# Patient Record
Sex: FEMALE | Race: WHITE | NOT HISPANIC OR LATINO | Employment: OTHER | URBAN - METROPOLITAN AREA
[De-identification: names, ages, dates, MRNs, and addresses within clinical notes are randomized per-mention and may not be internally consistent; named-entity substitution may affect disease eponyms.]

---

## 2017-04-03 ENCOUNTER — TELEPHONE (OUTPATIENT)
Dept: SURGERY | Facility: CLINIC | Age: 72
End: 2017-04-03

## 2017-04-03 NOTE — TELEPHONE ENCOUNTER
March 27, 2017 right screening mammogram.  Parenchyma is partially fatty replaced.  Negative right mammogram status post left mastectomy BI-RADS 1 negative.  [facility]

## 2017-04-04 ENCOUNTER — TELEPHONE (OUTPATIENT)
Dept: SURGERY | Facility: CLINIC | Age: 72
End: 2017-04-04

## 2017-04-04 ENCOUNTER — OFFICE VISIT (OUTPATIENT)
Dept: SURGERY | Facility: CLINIC | Age: 72
End: 2017-04-04

## 2017-04-04 VITALS
OXYGEN SATURATION: 98 % | SYSTOLIC BLOOD PRESSURE: 114 MMHG | DIASTOLIC BLOOD PRESSURE: 74 MMHG | TEMPERATURE: 97.8 F | WEIGHT: 155.2 LBS | BODY MASS INDEX: 27.5 KG/M2 | HEART RATE: 84 BPM | HEIGHT: 63 IN

## 2017-04-04 DIAGNOSIS — C50.212 MALIGNANT NEOPLASM OF UPPER-INNER QUADRANT OF LEFT FEMALE BREAST (HCC): ICD-10-CM

## 2017-04-04 DIAGNOSIS — Z12.31 ENCOUNTER FOR SCREENING MAMMOGRAM FOR MALIGNANT NEOPLASM OF BREAST: ICD-10-CM

## 2017-04-04 DIAGNOSIS — Z98.890 H/O MITRAL VALVE REPAIR: ICD-10-CM

## 2017-04-04 DIAGNOSIS — Z80.3 FH: BREAST CANCER: ICD-10-CM

## 2017-04-04 DIAGNOSIS — Z85.3 HISTORY OF BREAST CANCER IN FEMALE: ICD-10-CM

## 2017-04-04 PROCEDURE — 99213 OFFICE O/P EST LOW 20 MIN: CPT | Performed by: SURGERY

## 2017-04-04 RX ORDER — BUMETANIDE 1 MG/1
1 TABLET ORAL
COMMUNITY

## 2017-04-04 RX ORDER — POTASSIUM CHLORIDE 750 MG/1
10 TABLET, FILM COATED, EXTENDED RELEASE ORAL
COMMUNITY

## 2017-04-04 NOTE — TELEPHONE ENCOUNTER
Shandra's my risk panel dated March 10, 1978 detected a variant of uncertain significance.  No mutations detected.  lml

## 2017-04-04 NOTE — PROGRESS NOTES
Chief Complaint: Zeny Steve is a 71 y.o. female who was seen in consultation at the request of Fay Felix MD  for newly diagnosed breast cancer    History of Present Illness:  Patient presents with newly diagnosed breast cancer LEFT breast, second primary.    Her past breast cancer was diagnosed at age 41.  She was treated for this in 1986.  At that time she had a lumpectomy with Dr. mccormick as well as an axillary node dissection per her report.  She then received radiation therapy with Dr. Garsia at [facility].  She received no chemotherapy or tamoxifen per her report.    She notices some asymmetry in her breast exam in general as related to her past history of breast cancer.  She does note however that she did notice a thickening in the superior left breast around the time her sister was going to her cancer treatment.    She noted no other new masses, skin changes, nipple discharge, nipple changes prior to her most recent imaging.  Her most recent imaging includes the following: August 22, 2013 [facility] screening mammogram shows moderately dense breast tissue was stable upper outer quadrant left lumpectomy changes BI-RADS 2.  March 25, 2016 screening mammogram at [place] scattered fiber glandular densities, mass in the upper inner quadrant left breast, BI-RADS 0.  Then on April [facility], 2016 [facility] [place] [facility] mamm[facility] and ultrasound showed a 1.3 cm mass at 11:00, 7 cm from the nipple, posterior one third.  Ultrasound targeted to this area 11:00 8 cm from the nipple shows a 1.3 x 1.1 cm mass no abnormal lymph nodes seen BI-RADS 4.    She had a biopsy on the following day that showed: August 16, 2016 left breast ultrasound-guided biopsy a 12-gauge device ×6 cores.  Cork marker left in place.  Invasive mammary carcinoma, no special type, intermediate grade, no in situ component, fever new primary based on mammographic imaging, 7 mm, estrogen 0 progesterone 0 Ki-67  80% with a HER-2/willy ratio 1.15 and a copy number of 3.8.    I then ordered a breast MRI that showed in the left breast 12:00 posterior one third, 8 cm from the nipple a 2 x 1.6 x 1.5 cm mass.  Clip is within the mass, no other findings on the left, no findings on the right, no lymph nodes on the left looked abnormal.  She has her  ovaries, is postmenopausal, and takes nor hormones.  She had her uterus removed many years ago for bleeding.  Her family history includes the following: Mother age 40, sister age 40 named Christine Watson, a patient of ours, and niece aged 33 also a patient of ours named Shandra Cristobalver.      Pathology from left breast mastectomy. Note no sentinel lymph nodes were taken due to absence of radiotracer migration due to previous axillary lymph node dissection. Pathology returned as invasive mammary carcinoma, no special type, high grade, 3, 3, 3, 1.2 cm. Margins are clear closest margin being 1.2 cm from the deep margin. Negative skin and nipple. Stage TIc Nx    She reports no redness warmth or drainage from the incision.  Her drain has had 5 mL -5 mL- 5 mL over the past 3 days.  She took pain medicine for the first 2 days but then stopped this.    Interval history:  In the interim, Zeny took adjuvant TC ×4 cycles given every 3 weeks through a PICC line with Dr. Lebron.  She used a dignicap To keep her hair however she felt that this was only moderately helpful and was quite a time commitment.    She did not need radiation due to previous radiation.    She denies any nodules or skin discolorations to the left chest wall and denies any lumps in her armpits or neck.  She denies any masses, skin changes, nipple changes or discharge right breast.  She denies any headache, changes in balance, cough, belly pain, bone pain.    Her most recent imaging includes a right screening mammogram March 27, 2017 with no abnormal findings.  BI-RADS 1.    Review of Systems:  Review of Systems   Constitutional: Negative  for unexpected weight change (3 lb wt loss).   Respiratory: Positive for shortness of breath (ONLY W/PHYSICAL EXERTION).    Gastrointestinal: Positive for constipation.   Genitourinary: Positive for bladder incontinence.    Musculoskeletal: Positive for arthralgias.   Psychiatric/Behavioral: Positive for depression. The patient is nervous/anxious.    All other systems reviewed and are negative.       Past Medical and Surgical History:  Breast Biopsy History:  Patient has had the following breast biopsies:1986, LEFT BREAST BIOPSY, MALIGNANT  Breast Cancer HIstory:  Patient has the following past medical history of breast cancer treatment: LEFT BREAST LUMPECTOMY 1986, FOLLOWED BY RADIATION 6/1986.   Breast Operations, and year:  LEFT BREAST LUMPECTOMY BY DR. WILLAMS 1986  Obstetric/Gynecologic History:  Age menstrual periods began: 10  Patient is postmenopausal due to removal of her uterus in the following year: 1977   Number of pregnancies:3  Number of live births: 3  Number of abortions or miscarriages: 0  Age of delivery of first child: 17 YRS OLD   Patient did not breast feed.  Length of time taking birth control pills: APPROX. 2 YRS   Patient has never taken hormone replacement  PATIENT HAS OVARIES.     Past Surgical History:   Procedure Laterality Date   • BREAST LUMPECTOMY Left 1986   • CATARACT EXTRACTION     • GANGLION CYST EXCISION Left 1966   • HYSTERECTOMY  1977   • MASTECTOMY WITH SENTINEL NODE BIOPSY AND AXILLARY NODE DISSECTION Left 10/20/2016    Procedure: left total mastectomy;  Surgeon: Sneha Jameson MD;  [facility]  Service:    • MITRAL VALVULOPLASTY  06/10/2014   • MOLE REMOVAL      MULTIPLE SITES   • TOE NAIL AMPUTATION      LEFT FOOT       Past Medical History:   Diagnosis Date   • Acid reflux    • Anemia    • Arthritis    • CAD (coronary artery disease)    • Cancer     LUMPECTOMY 1986, HAS NEW SPOT IN LT BREAST   • Disease of thyroid gland     HYPOTHYROID   • Hyperlipidemia   "  • Knee pain     LEFT KNEE-ARTHRITIS/ NEEDS TOTAL KNEE REPLACEMENT   • Mitral regurgitation    • Mitral valve disorder     HAD MVR 6-2014   • Osteoarthritis    • Palpitations    • PONV (postoperative nausea and vomiting)    • Seasonal allergies    • Vitamin D deficiency        Prior Hospitalizations, other than for surgery or childbirth, and year:  NONE     Social History     Social History   • Marital status:      Spouse name: N/A   • Number of children: N/A   • Years of education: N/A     Occupational History   • Not on file.     Social History Main Topics   • Smoking status: Never Smoker   • Smokeless tobacco: Never Used   • Alcohol use No   • Drug use: No   • Sexual activity: Not on file     Other Topics Concern   • Not on file     Social History Narrative     Patient is .  Patient is retired.  Patient drinks 1 servings of caffeine per day.    Family History:  Family History   Problem Relation Age of Onset   • Breast cancer Mother 40   • Lung cancer Father    • Breast cancer Sister 40     BREAST CA REACCURANCE   • Breast cancer Other 33   • Lung cancer Other        Vital Signs:  /74  Pulse 84  Temp 97.8 °F (36.6 °C)  Ht 63\" (160 cm)  Wt 155 lb 3.2 oz (70.4 kg)  SpO2 98%  BMI 27.49 kg/m2     Medications:    Current Outpatient Prescriptions:   •  acetaminophen (TYLENOL) 500 MG tablet, Take 500 mg by mouth Every 6 (Six) Hours As Needed for mild pain (1-3)., Disp: , Rfl:   •  aspirin 81 MG EC tablet, Take 81 mg by mouth Daily., Disp: , Rfl:   •  bumetanide (BUMEX) 1 MG tablet, Take 1 mg by mouth., Disp: , Rfl:   •  celecoxib (CeleBREX) 200 MG capsule, Take 200 mg by mouth Daily., Disp: , Rfl:   •  cetirizine (ZyrTEC) 10 MG tablet, Take 10 mg by mouth Every Evening., Disp: , Rfl:   •  cimetidine (TAGAMET) 200 MG tablet, Take 200 mg by mouth Every Evening., Disp: , Rfl:   •  guaiFENesin (MUCINEX) 600 MG 12 hr tablet, Take 1,200 mg by mouth 2 (Two) Times a Day As Needed for cough., Disp: , " "Rfl:   •  levothyroxine (SYNTHROID, LEVOTHROID) 50 MCG tablet, Take 50 mcg by mouth Every Morning., Disp: , Rfl:   •  metoprolol succinate XL (TOPROL-XL) 50 MG 24 hr tablet, Take 50 mg by mouth Every Evening., Disp: , Rfl:   •  potassium chloride (K-DUR) 10 MEQ CR tablet, Take 10 mEq by mouth., Disp: , Rfl:   •  Pseudoephedrine-Acetaminophen (SINUS RELIEF PO), Take 1 tablet by mouth As Needed., Disp: , Rfl:   •  vitamin D (ERGOCALCIFEROL) 94746 UNITS capsule capsule, 50,000 Units Every 7 (Seven) Days. TAKES ON SUNDAY, Disp: , Rfl:      Allergies:  Allergies   Allergen Reactions   • Codeine Rash     rash       Physical Examination:  /74  Pulse 84  Temp 97.8 °F (36.6 °C)  Ht 63\" (160 cm)  Wt 155 lb 3.2 oz (70.4 kg)  SpO2 98%  BMI 27.49 kg/m2  General Appearance:  Patient is in no distress.  She is well kept and has an average build.   Psychiatric:  Patient with appropriate mood and affect. Alert and oriented to self, time, and place.    Breast, RIGHT:  medium sized, asymmetric with the contralateral  surgically absent side.  Breast skin is without erythema, edema, rashes.  There are no visible abnormalities upon inspection during the arm-raising maneuver or with hands on hips in the sitting position. There is no nipple retraction, discharge or nipple/areolar skin changes.There are no masses palpable in the sitting or supine positions.    Breast, LEFT:  surgically absent with a well healed vertical incision from her mastectomy October 20, 2016.  The incision was made vertically due to the proximity of tumor to skin at the 12:00 middle ring location.  No skin nodules or discolorations.    - Note median sternotomy from mitral valve repair in 2014.      Lymphatic:  There is no axillary, cervical, infraclavicular, or supraclavicular adenopathy bilaterally.  There is a soft tissue void in the left axilla from her prior axillary node dissection.  Eyes:  Pupils are round and reactive to " light.  Cardiovascular:  Heart rate and rhythm are regular.  Respiratory:  Lungs are clear bilaterally with no crackles or wheezes in any lung field.  Gastrointestinal:  Abdomen is soft, nondistended, and nontender.   Musculoskeletal:  Good strength in all 4 extremities.   There is limited range of motion in the LEFT shoulder, normal range of motion in the RIGHT shoulder.    Skin:  No new skin lesions or rashes on the skin excluding the breast (see breast exam above).        Imagin-22-13  [facility]  SCREENING MAMMOGRAM  JERMAN CELAYA  Stable post lumpectomy scarring in the upper outer left breast.  BIRADS:  2    3-25-16    MAMMOGRAM SCREENING BILATERAL  JERMAN CELAYA  Scattered fibroglandular densities.  Irregular mass seen in the upper inner quadrant of the left breast.  BIRADS CATEGORY:  0    -17-16    [facility]   LEFT DIAGNOSTIC MAMMO AND LEFT US    JERMAN CELAYA  The irregular mass in the upper inner quadrant of the left breast persists.  It measure 13 mm and is located at approximately the 11 o’clock position cm from the nipple, posterior depth.  Targeted ultrasound of the left breast at the 11 o’clock 8 cm from the nipple demonstrates an hypoechoic mass 13 x 8 x 11 mm and corresponds to the mammographic mass morphologically abnormal lymph nodes.  BIRADS CATEGORY:  4      29-16                               [facility] DR PRADO                              BILATERAL MRI                        JERMAN CELAYA  Left breast 12 o’clock posterior 1/3 of 8 cm from the nipple clip within an irregular enhancing mass. The mass measures 1.5 cm in the anterior to posterior 1.6 cm in the superior to inferior 2.0 cm in the medial to lateral. No other abnormal enhancement left breast. There is no evidence for left axillary adenopathy. There are no abnormal enhancement or morphology in the right.    2017 right screening mammogram. Parenchyma is partially fatty replaced. Negative right mammogram  "status post left mastectomy BI-RADS 1 negative. [facility]    Pathology:    8-16-16  [facility]  BIOPSY LEFT US GUIDED    JERMAN CELAYA  Left breast mass at 11 o’clock.  12 G [place], 6 core specimens, [place] cork shaped marker.    8-16-16  [facility]   PATHOLOGY    JERMAN CELAYA  Amplification of the HER-2 gene is not detected with ratio of 1.15.    Ki-67= 80%  ER=  0  NJ=  0  FINAL DIAGNOSIS:  Left breast 11:00, 8 cm from nipple.  Moderately differentiated ductal adenocarcinoma without in SITU component.  Favor new primary history of 1986 Ipsilateral Breast Ca noted (correlate with mammography).  Length of invasive component in material reviewed 7 mm.  Amplification of the HER-2 gene in not detected.  Her2/willy=  3.8  Ratio=  1.15     10/20/16            [facility]                 SURGICAL PATHOLOGY                   JERMAN CELAYA  Final Diagnosis   \"LEFT BREAST MASTECTOMY\":  INVASIVE MAMMARY CARCINOMA, NO SPECIAL TYPE, HIGH GRADE, 1.2 CM, MARGINS CLEAR -  SEE NOTE.  CLOSEST MARGIN: 1.2 CM (DEEP).  NEGATIVE SKIN AND NIPPLE.  NO LYMPH NODES IDENTIFIED.  HORMONE RECEPTORS, HER-2 WILLY APPARENTLY PERFORMED ON PRIOR BIOPSY.   REPEAT/CONFIRMATORY STUDIES ARE AVAILABLE UPON REQUEST.     This high grade tumor is consistent with a primary breast cancer in the absence of other suspicious primary tumor source. No in-situ carcinoma is seen. Confirmatory special stains are available upon request to prove/disprove this  Consideration. The tumor is staged as a primary breast cancer.     THM/erasmo IHC/a/TDJ       Addendum to pathology report dictated that the correct closest margin is 6 mm to the deep margin        Assessment:   Diagnosis Plan   1. Malignant neoplasm of upper-inner quadrant of left female breast  Mammo Screening Right With CAD    Mammo Screening Right With CAD    Ambulatory Referral to Multi-Disciplinary Clinic    Ambulatory Referral to Physical Therapy Bioimpedence, Lymphedema   2. History of breast cancer in female  " Mammo Screening Right With CAD    Mammo Screening Right With CAD    Ambulatory Referral to Multi-Disciplinary Clinic    Ambulatory Referral to Physical Therapy Bioimpedence, Lymphedema   3. FH: breast cancer  Mammo Screening Right With CAD    Mammo Screening Right With CAD    Ambulatory Referral to Multi-Disciplinary Clinic    Ambulatory Referral to Physical Therapy Bioimpedence, Lymphedema   4. H/O mitral valve repair  Mammo Screening Right With CAD    Mammo Screening Right With CAD    Ambulatory Referral to Multi-Disciplinary Clinic    Ambulatory Referral to Physical Therapy Bioimpedence, Lymphedema   5. Encounter for screening mammogram for malignant neoplasm of breast   Mammo Screening Right With CAD        1-  Left breast, palpable mass 11:00, 7.5 cm from the nipple.  1.3 cm on ultrasound, 2.75 cm on examination, 2.0 cm on MRI.  Clip seen within.  Lymph nodes normal on axillary ultrasound, examination, and MRI.  Invasive mammary carcinoma, no special type, intermediate grade, no in situ, 7 mm core, estrogen 0 progesterone 0 Ki-67 80%.  HER-2/willy negative with ratio of 1.15 and copy number of 3.8.    Clinical stage TICN0 stage Ia  [place] to be second primary not a recurrence.    October 20, 2016, left total mastectomy with no reconstruction.  [place] lymph node biopsy was attempted but there was no radiotracer migration due to prior complete axillary lymph node dissection.  Pathology- invasive mammary carcinoma, no special type, high grade, 3, 3, 3, 1.2 cm. Margins are clear closest margin being 1.2 cm from the deep margin. Negative skin and nipple. Stage TIc Nx    2-  Past history left breast cancer age 41, status post lumpectomy and radiation 1986 Dr. Felton.  No chemotherapy or tamoxifen.  Upper outer quadrant     3-  Mother age 40, sister (Christine Watson) age 40, niece (Shandra Bowling)  age 33  Niece has had genetic testing and found to be negative.  Shandra- Currently has pending panel testing.    4-  History of  mitral valve repair followed by Dr. Pierre      Plan:   Zeny is doing well today at her 6 moFU visit.  We reviewed her interval history, treatment, most recent imaging and reports, and examination together today.    There is no evidence of disease on her imaging or by exam or symptoms.    We are still awaiting Shandra's panel testing.    We discussed the survivorship clinic and she was interested in getting a treatment summary so we will arrange for that.  We discussed bio impedance surveillance for lymphedema and she was interested in this so we will try to schedule those for the same day due to her drive.    Her next right-sided mammogram is due March 28, 2018 and I will arrange for that and see her back after.  She usually has this at [facility].  This is in [place].    She reports that she does not need a refill on her post mastectomy bra and prostheses.    I've asked to continue her self breast exam and to call us in the interim with any concerns or changes.  Otherwise I will see her in March.      Sneha Jameson MD      We spent 16 min in visit 10 in face to face     Next Appointment:  Return for Next scheduled follow up, after imaging.      EMR Dragon/transcription disclaimer:    Much of this encounter note is an electronic transcription/translocation of spoken language to printed text.  The electronic translation of spoken language may permit erroneous, or at times, nonsensical words or phrases to be inadvertently transcribed.  Although I have reviewed the note from such areas, some may still exist.

## 2017-04-11 ENCOUNTER — TELEPHONE (OUTPATIENT)
Dept: OTHER | Facility: HOSPITAL | Age: 72
End: 2017-04-11

## 2017-04-11 NOTE — TELEPHONE ENCOUNTER
"Call placed to patient RE: open referral to survivorship clinic from Dr Jameson. I spoke to the patient and reviewed purpose and goals of survivorship visit as well as what to expect. Patient declines at present due to lack of interest - states, \"I don't want to talk about it anymore.\"  I offered to mail materials and contact information should she reconsider and she was agreeable to this. Info mailed to patient.  "

## 2017-08-03 ENCOUNTER — TELEPHONE (OUTPATIENT)
Dept: SURGERY | Facility: CLINIC | Age: 72
End: 2017-08-03

## 2017-08-03 NOTE — TELEPHONE ENCOUNTER
Note dated August 2, 2017 medical oncology from Dr. Gurvinder Lebron: November 30 through the 22nd 2017 4 cycles of TC complicated by drug extravasation.

## 2018-01-03 ENCOUNTER — TELEPHONE (OUTPATIENT)
Dept: SURGERY | Facility: CLINIC | Age: 73
End: 2018-01-03

## 2018-01-03 NOTE — TELEPHONE ENCOUNTER
MARLEEN for pt to call.    Scheduled Rt Screen Mammo 3-29-18 @ 10:00 [facility]  Return to see Dr CASTELLANOS 4-9-18 arrive 12:45    kdl      Pt called and confirmed  She moved her appt with Dr CASTELLANOS to  4/12/18 arrive 3:15

## 2018-04-06 ENCOUNTER — TELEPHONE (OUTPATIENT)
Dept: SURGERY | Facility: CLINIC | Age: 73
End: 2018-04-06

## 2018-04-07 NOTE — TELEPHONE ENCOUNTER
[facility] RIGHT screen mammo 3-29-18- scatt FG- [id number]

## 2018-04-12 ENCOUNTER — OFFICE VISIT (OUTPATIENT)
Dept: SURGERY | Facility: CLINIC | Age: 73
End: 2018-04-12

## 2018-04-12 VITALS
BODY MASS INDEX: 28.88 KG/M2 | DIASTOLIC BLOOD PRESSURE: 77 MMHG | WEIGHT: 163 LBS | HEIGHT: 63 IN | HEART RATE: 73 BPM | SYSTOLIC BLOOD PRESSURE: 123 MMHG | OXYGEN SATURATION: 98 %

## 2018-04-12 DIAGNOSIS — Z17.1 MALIGNANT NEOPLASM OF UPPER-INNER QUADRANT OF LEFT BREAST IN FEMALE, ESTROGEN RECEPTOR NEGATIVE (HCC): Primary | ICD-10-CM

## 2018-04-12 DIAGNOSIS — Z85.3 HISTORY OF BREAST CANCER IN FEMALE: ICD-10-CM

## 2018-04-12 DIAGNOSIS — Z12.31 ENCOUNTER FOR SCREENING MAMMOGRAM FOR MALIGNANT NEOPLASM OF BREAST: ICD-10-CM

## 2018-04-12 DIAGNOSIS — Z80.3 FH: BREAST CANCER: ICD-10-CM

## 2018-04-12 DIAGNOSIS — C50.212 MALIGNANT NEOPLASM OF UPPER-INNER QUADRANT OF LEFT BREAST IN FEMALE, ESTROGEN RECEPTOR NEGATIVE (HCC): Primary | ICD-10-CM

## 2018-04-12 DIAGNOSIS — Z98.890 H/O MITRAL VALVE REPAIR: ICD-10-CM

## 2018-04-12 PROCEDURE — 99213 OFFICE O/P EST LOW 20 MIN: CPT | Performed by: SURGERY

## 2018-04-12 RX ORDER — SODIUM PHOSPHATE,MONO-DIBASIC 19G-7G/118
ENEMA (ML) RECTAL
COMMUNITY

## 2018-04-12 NOTE — PROGRESS NOTES
Chief Complaint: Zeny Steve is a 72 y.o. female who was seen in consultation at the request of No ref. provider found  for newly diagnosed breast cancer and a followup visit    History of Present Illness:  Patient presents with newly diagnosed breast cancer LEFT breast, second primary.    Her past breast cancer was diagnosed at age 41.  She was treated for this in 1986.  At that time she had a lumpectomy with Dr. mccormick as well as an axillary node dissection per her report.  She then received radiation therapy with Dr. Garsia at [facility].  She received no chemotherapy or tamoxifen per her report.    She notices some asymmetry in her breast exam in general as related to her past history of breast cancer.  She does note however that she did notice a thickening in the superior left breast around the time her sister was going to her cancer treatment.    She noted no other new masses, skin changes, nipple discharge, nipple changes prior to her most recent imaging.  Her most recent imaging includes the following: August 22, 2013 [facility] screening mammogram shows moderately dense breast tissue was stable upper outer quadrant left lumpectomy changes BI-RADS 2.  March 25, 2016 screening mammogram at [place] scattered fiber glandular densities, mass in the upper inner quadrant left breast, BI-RADS 0.  Then on April 7, 2016 at [facility] left diagnostic mammogram and ultrasound showed a 1.3 cm mass at 11:00, 7 cm from the nipple, posterior one third.  Ultrasound targeted to this area 11:00 8 cm from the nipple shows a 1.3 x 1.1 cm mass no abnormal lymph nodes seen BI-RADS 4.    She had a biopsy on the following day that showed: August 16, 2016 left breast ultrasound-guided biopsy a 12-gauge device ×6 cores.  Cork marker left in place.  Invasive mammary carcinoma, no special type, intermediate grade, no in situ component, fever new primary based on mammographic imaging, 7 mm, estrogen 0  progesterone 0 Ki-67 80% with a HER-2/willy ratio 1.15 and a copy number of 3.8.    I then ordered a breast MRI that showed in the left breast 12:00 posterior one third, 8 cm from the nipple a 2 x 1.6 x 1.5 cm mass.  Clip is within the mass, no other findings on the left, no findings on the right, no lymph nodes on the left looked abnormal.  She has her  ovaries, is postmenopausal, and takes nor hormones.  She had her uterus removed many years ago for bleeding.  Her family history includes the following: Mother age 40, sister age 40 named Christine Watson, a patient of ours, and niece aged 33 also a patient of ours named Shandra Bowling.      Pathology from left breast mastectomy. Note no sentinel lymph nodes were taken due to absence of radiotracer migration due to previous axillary lymph node dissection. Pathology returned as invasive mammary carcinoma, no special type, high grade, 3, 3, 3, 1.2 cm. Margins are clear closest margin being 1.2 cm from the deep margin. Negative skin and nipple. Stage TIc Nx    She reports no redness warmth or drainage from the incision.  Her drain has had 5 mL -5 mL- 5 mL over the past 3 days.  She took pain medicine for the first 2 days but then stopped this.      In the interim, Zeny took adjuvant TC ×4 cycles given every 3 weeks through a PICC line with Dr. Lebron.  She used a dignicap To keep her hair however she felt that this was only moderately helpful and was quite a time commitment.    She did not need radiation due to previous radiation.    She denies any nodules or skin discolorations to the left chest wall and denies any lumps in her armpits or neck.  She denies any masses, skin changes, nipple changes or discharge right breast.  She denies any headache, changes in balance, cough, belly pain, bone pain.    Her most recent imaging includes a right screening mammogram March 27, 2017 with no abnormal findings.  BI-RADS 1.    Interval history:    In the interim,  Zeny Steve  has done  well.  She has noted no changes in her breast exam. No new masses, skin changes, nipple changes, nipple discharge either breast.   She denies headache, bone pain, belly pain, cough, changes in vision or gait.    Her most recent imaging includes the following:  [facility] RIGHT screen mammo 3-29-18- [facility]- BR2    She has gained 8#.    Review of Systems:  Review of Systems   Constitutional: Positive for unexpected weight change (8 LB GAIN).   Respiratory: Positive for shortness of breath (ONLY W/PHYSICAL EXERTION).    Gastrointestinal: Positive for constipation.   Genitourinary: Positive for bladder incontinence.    Musculoskeletal: Positive for arthralgias.   Psychiatric/Behavioral: Positive for depression. The patient is nervous/anxious.    All other systems reviewed and are negative.       Past Medical and Surgical History:  Breast Biopsy History:  Patient has had the following breast biopsies:1986, LEFT BREAST BIOPSY, MALIGNANT  Breast Cancer HIstory:  Patient has the following past medical history of breast cancer treatment: LEFT BREAST LUMPECTOMY 1986, FOLLOWED BY RADIATION 6/1986.   Breast Operations, and year:  LEFT BREAST LUMPECTOMY BY DR. WILLAMS 1986  Obstetric/Gynecologic History:  Age menstrual periods began: 10  Patient is postmenopausal due to removal of her uterus in the following year: 1977   Number of pregnancies:3  Number of live births: 3  Number of abortions or miscarriages: 0  Age of delivery of first child: 17 YRS OLD   Patient did not breast feed.  Length of time taking birth control pills: APPROX. 2 YRS   Patient has never taken hormone replacement  PATIENT HAS OVARIES.     Past Surgical History:   Procedure Laterality Date   • BREAST LUMPECTOMY Left 1986   • CATARACT EXTRACTION     • GANGLION CYST EXCISION Left 1966   • HYSTERECTOMY  1977   • MASTECTOMY WITH SENTINEL NODE BIOPSY AND AXILLARY NODE DISSECTION Left 10/20/2016    Procedure: left total mastectomy;  Surgeon: Sneha Jameson,  "MD;  [place] [facility] [place] [facility]:    • MITRAL VALVULOPLASTY  06/10/2014   • MOLE REMOVAL      MULTIPLE SITES   • TOE NAIL AMPUTATION      LEFT FOOT       Past Medical History:   Diagnosis Date   • Acid reflux    • Anemia    • Arthritis    • CAD (coronary artery disease)    • Cancer     LUMPECTOMY 1986, HAS NEW SPOT IN LT BREAST   • Disease of thyroid gland     HYPOTHYROID   • Hyperlipidemia    • Knee pain     LEFT KNEE-ARTHRITIS/ NEEDS TOTAL KNEE REPLACEMENT   • Mitral regurgitation    • Mitral valve disorder     HAD MVR 6-2014   • Osteoarthritis    • Palpitations    • PONV (postoperative nausea and vomiting)    • Seasonal allergies    • Vitamin D deficiency        Prior Hospitalizations, other than for surgery or childbirth, and year:  NONE     Social History     Social History   • Marital status:      Spouse name: N/A   • Number of children: N/A   • Years of education: N/A     Occupational History   • Not on file.     Social History Main Topics   • Smoking status: Never Smoker   • Smokeless tobacco: Never Used   • Alcohol use No   • Drug use: No   • Sexual activity: Not on file     Other Topics Concern   • Not on file     Social History Narrative   • No narrative on file     Patient is .  Patient is retired.  Patient drinks 1 servings of caffeine per day.    Family History:  Family History   Problem Relation Age of Onset   • Breast cancer Mother 40   • Lung cancer Father    • Breast cancer Sister 40     BREAST CA REACCURANCE   • Breast cancer Other 33   • Lung cancer Other        Vital Signs:  /77   Pulse 73   Ht 160 cm (63\")   Wt 73.9 kg (163 lb)   SpO2 98%   BMI 28.87 kg/m²      Medications:    Current Outpatient Prescriptions:   •  acetaminophen (TYLENOL) 500 MG tablet, Take 500 mg by mouth Every 6 (Six) Hours As Needed for mild pain (1-3)., Disp: , Rfl:   •  aspirin 81 MG EC tablet, Take 81 mg by mouth Daily., Disp: , Rfl:   •  bumetanide (BUMEX) 1 MG tablet, Take 1 mg by mouth., " "Disp: , Rfl:   •  celecoxib (CeleBREX) 200 MG capsule, Take 200 mg by mouth Daily., Disp: , Rfl:   •  cetirizine (ZyrTEC) 10 MG tablet, Take 10 mg by mouth Every Evening., Disp: , Rfl:   •  cimetidine (TAGAMET) 200 MG tablet, Take 200 mg by mouth Every Evening., Disp: , Rfl:   •  glucosamine sulfate 500 MG capsule capsule, Take  by mouth 3 (Three) Times a Day With Meals., Disp: , Rfl:   •  guaiFENesin (MUCINEX) 600 MG 12 hr tablet, Take 1,200 mg by mouth 2 (Two) Times a Day As Needed for cough., Disp: , Rfl:   •  levothyroxine (SYNTHROID, LEVOTHROID) 50 MCG tablet, Take 50 mcg by mouth Every Morning., Disp: , Rfl:   •  metoprolol succinate XL (TOPROL-XL) 50 MG 24 hr tablet, Take 50 mg by mouth Every Evening., Disp: , Rfl:   •  potassium chloride (K-DUR) 10 MEQ CR tablet, Take 10 mEq by mouth., Disp: , Rfl:   •  Pseudoephedrine-Acetaminophen (SINUS RELIEF PO), Take 1 tablet by mouth As Needed., Disp: , Rfl:   •  vitamin D (ERGOCALCIFEROL) 13479 UNITS capsule capsule, 50,000 Units Every 7 (Seven) Days. TAKES ON SUNDAY, Disp: , Rfl:      Allergies:  Allergies   Allergen Reactions   • Codeine Rash     rash       Physical Examination:  /77   Pulse 73   Ht 160 cm (63\")   Wt 73.9 kg (163 lb)   SpO2 98%   BMI 28.87 kg/m²   General Appearance:  Patient is in no distress.  She is well kept and has an average build.   Psychiatric:  Patient with appropriate mood and affect. Alert and oriented to self, time, and place.    Breast, RIGHT:  medium sized, asymmetric with the contralateral  surgically absent side.  Breast skin is without erythema, edema, rashes.  There are no visible abnormalities upon inspection during the arm-raising maneuver or with hands on hips in the sitting position. There is no nipple retraction, discharge or nipple/areolar skin changes.There are no masses palpable in the sitting or supine positions.    Breast, LEFT:  surgically absent with a well healed vertical incision from her mastectomy " 2016.  The incision was made vertically due to the proximity of tumor to skin at the 12:00 middle ring location.  No skin nodules or discolorations.    - Note median sternotomy from mitral valve repair in .      Lymphatic:  There is no axillary, cervical, infraclavicular, or supraclavicular adenopathy bilaterally.  There is a soft tissue void in the left axilla from her prior axillary node dissection.  Eyes:  Pupils are round and reactive to light.  Cardiovascular:  Heart rate and rhythm are regular.  Respiratory:  Lungs are clear bilaterally with no crackles or wheezes in any lung field.  Gastrointestinal:  Abdomen is soft, nondistended, and nontender.   Musculoskeletal:  Good strength in all 4 extremities.   There is limited range of motion in the LEFT shoulder, normal range of motion in the RIGHT shoulder.    Skin:  No new skin lesions or rashes on the skin excluding the breast (see breast exam above).        Imagin-22-13  [facility]  SCREENING MAMMOGRAM  CHANDLER CELAYADA  Stable post lumpectomy scarring in the upper outer left breast.  BIRADS:  2    3-25-16    MAMMOGRAM SCREENING BILATERAL  BOTHE JERMAN  Scattered fibroglandular densities.  Irregular mass seen in the upper inner quadrant of the left breast.  BIRADS CATEGORY:  0    4-17-16    [facility]   LEFT DIAGNOSTIC MAMMO AND LEFT US    BOTHE JERMAN  The irregular mass in the upper inner quadrant of the left breast persists.  It measure 13 mm and is located at approximately the 11 o’clock position cm from the nipple, posterior depth.  Targeted ultrasound of the left breast at the 11 o’clock 8 cm from the nipple demonstrates an hypoechoic mass 13 x 8 x 11 mm and corresponds to the mammographic mass morphologically abnormal lymph nodes.  BIRADS CATEGORY:  4      29-16                               [facility] DR PRADO                              BILATERAL MRI                        BOTHE, JERMAN  Left breast 12 o’clock posterior  "1/3 of 8 cm from the nipple clip within an irregular enhancing mass. The mass measures 1.5 cm in the anterior to posterior 1.6 cm in the superior to inferior 2.0 cm in the medial to lateral. No other abnormal enhancement left breast. There is no evidence for left axillary adenopathy. There are no abnormal enhancement or morphology in the right.    March 27, 2017 right screening mammogram. Parenchyma is partially fatty replaced. Negative right mammogram status post left mastectomy BI-RADS 1 negative. [facility] RIGHT screen mammo 3-29-18- [facility]- BR2    Pathology:    8-16-16  [facility]  BIOPSY LEFT US GUIDED    CHRISTENTY JERMAN  Left breast mass at 11 o’clock.  12 G [place], 6 core specimens, [employer] cork shaped marker.    8-16-16  [facility]   PATHOLOGY    JERMAN CELAYA  Amplification of the HER-2 gene is not detected with ratio of 1.15.    Ki-67= 80%  ER=  0  GA=  0  FINAL DIAGNOSIS:  Left breast 11:00, 8 cm from nipple.  Moderately differentiated ductal adenocarcinoma without in SITU component.  Favor new primary history of 1986 Ipsilateral Breast Ca noted (correlate with mammography).  Length of invasive component in material reviewed 7 mm.  Amplification of the HER-2 gene in not detected.  Her2/willy=  3.8  Ratio=  1.15     10/20/16            [facility]                 SURGICAL PATHOLOGY                   JERMAN CELAYA  Final Diagnosis   \"LEFT BREAST MASTECTOMY\":  INVASIVE MAMMARY CARCINOMA, NO SPECIAL TYPE, HIGH GRADE, 1.2 CM, MARGINS CLEAR -  SEE NOTE.  CLOSEST MARGIN: 1.2 CM (DEEP).  NEGATIVE SKIN AND NIPPLE.  NO LYMPH NODES IDENTIFIED.  HORMONE RECEPTORS, HER-2 WILLY APPARENTLY PERFORMED ON PRIOR BIOPSY.   REPEAT/CONFIRMATORY STUDIES ARE AVAILABLE UPON REQUEST.     This high grade tumor is consistent with a primary breast cancer in the absence of other suspicious primary tumor source. No in-situ carcinoma is seen. Confirmatory special stains are available upon request to prove/disprove " this  Consideration. The tumor is staged as a primary breast cancer.     THM/jse IHC/a/TDJ       Addendum to pathology report dictated that the correct closest margin is 6 mm to the deep margin    Note dated August 2, 2017 medical oncology from Dr. Gurvinder Lebron: November 30 through the 22nd 2017 4 cycles of TC complicated by drug extravasation.    Assessment:   Diagnosis Plan   1. Malignant neoplasm of upper-inner quadrant of left breast in female, estrogen receptor negative  Mammo Screening Modified With Tomosynthesis Right With CAD   2. History of breast cancer in female     3. FH: breast cancer     4. H/O mitral valve repair     5. Encounter for screening mammogram for malignant neoplasm of breast   Mammo Screening Modified With Tomosynthesis Right With CAD      1-  Left breast, palpable mass 11:00, 7.5 cm from the nipple.  1.3 cm on ultrasound, 2.75 cm on examination, 2.0 cm on MRI.  Clip seen within.  Lymph nodes normal on axillary ultrasound, examination, and MRI.  Invasive mammary carcinoma, no special type, intermediate grade, no in situ, 7 mm core, estrogen 0 progesterone 0 Ki-67 80%.  HER-2/willy negative with ratio of 1.15 and copy number of 3.8.    Clinical stage TIcN0 stage Ia  [place] to be second primary not a recurrence.    October 20, 2016, left total mastectomy with no reconstruction.  [place] lymph node biopsy was attempted but there was no radiotracer migration due to prior complete axillary lymph node dissection.  Pathology- invasive mammary carcinoma, no special type, high grade, 3, 3, 3, 1.2 cm. Margins are clear closest margin being 1.2 cm from the deep margin. Negative skin and nipple. Stage TIc Nx    2-  Past history left breast cancer age 41, status post lumpectomy and radiation 1986 Dr. Felton.  No chemotherapy or tamoxifen.  Upper outer quadrant     3-  Mother age 40, sister (Christine Watson) age 40, niece (Shandra Bowling)  age 33  Niece has had genetic testing and found to be negative.  Shandra-  Currently has pending panel testing.  [facility] My Risk panel was negative for mutation- VUS noted in the APC gene dated 3-17-17    4-  History of mitral valve repair followed by Dr. Pierre- presently cancelled LEFT knee surgery to have heart cath      Plan:   Zeny is doing well today at her 1.5 year FU visit.  We reviewed her interval history, treatment, most recent imaging and reports, and examination together today.    There is no evidence of disease on her imaging or by exam or symptoms.    She has declined survivorship at this time.    Her next right-sided mammogram is due March 30, 2018 [facility]  and I will arrange for that and see her back after.    She reports that [facility]on need a refill on her post mastectomy bra and prostheses and we gave her this today.    I've asked to continue her self breast exam and to call us in the interim with any concerns or changes.  Otherwise I will see her in March.      SHe sees Dr Lebron next month.  She has gained weight but is back to her pre treatment baseline of 163.    Note she tells me today that she had to cancel her LEFT knee surgery in order to have a heart cath.    Sneha Jameson MD      We spent 15 min in visit 9 in face to face     Next Appointment:  Return for Next scheduled follow up, after imaging.      EMR Dragon/transcription disclaimer:    Much of this encounter note is an electronic transcription/translocation of spoken language to printed text.  The electronic translation of spoken language may permit erroneous, or at times, nonsensical words or phrases to be inadvertently transcribed.  Although I have reviewed the note from such areas, some may still exist.

## 2018-07-02 ENCOUNTER — TELEPHONE (OUTPATIENT)
Dept: SURGERY | Facility: CLINIC | Age: 73
End: 2018-07-02

## 2018-07-02 NOTE — TELEPHONE ENCOUNTER
Note from Dr. Gurvinder Lebron dated June 20, 2018: Patient received 4 cycles of Taxotere and cyclophosphamide from November 30, 2016 through February 2, 2017..  Plan for a 4-5 month follow-up.

## 2019-01-15 ENCOUNTER — TELEPHONE (OUTPATIENT)
Dept: SURGERY | Facility: CLINIC | Age: 74
End: 2019-01-15

## 2019-02-21 ENCOUNTER — TELEPHONE (OUTPATIENT)
Dept: SURGERY | Facility: CLINIC | Age: 74
End: 2019-02-21

## 2019-02-21 NOTE — TELEPHONE ENCOUNTER
[facility] does not do the [facility] imaging.    I have scheduled pt for  Rt 3D screen Mammo at [facility]  4-2-19 arrive 11:15    Return to see Dr CASTELLANOS 4-15-19 arrive   12:15    Spoke to pt she v/u with appointments  kdl

## 2019-04-08 ENCOUNTER — TELEPHONE (OUTPATIENT)
Dept: SURGERY | Facility: CLINIC | Age: 74
End: 2019-04-08

## 2019-04-08 NOTE — TELEPHONE ENCOUNTER
[facility] April 2, 2019 right screening mammogram with 3D.  Scattered fibroglandular densities in the right breast.  BI-RADS 1.

## 2019-04-24 ENCOUNTER — OFFICE VISIT (OUTPATIENT)
Dept: SURGERY | Facility: CLINIC | Age: 74
End: 2019-04-24

## 2019-04-24 VITALS
SYSTOLIC BLOOD PRESSURE: 118 MMHG | OXYGEN SATURATION: 98 % | HEART RATE: 76 BPM | HEIGHT: 63 IN | WEIGHT: 164 LBS | BODY MASS INDEX: 29.06 KG/M2 | DIASTOLIC BLOOD PRESSURE: 62 MMHG

## 2019-04-24 DIAGNOSIS — Z80.3 FH: BREAST CANCER: ICD-10-CM

## 2019-04-24 DIAGNOSIS — Z85.3 HISTORY OF BREAST CANCER IN FEMALE: ICD-10-CM

## 2019-04-24 DIAGNOSIS — C50.212 MALIGNANT NEOPLASM OF UPPER-INNER QUADRANT OF LEFT BREAST IN FEMALE, ESTROGEN RECEPTOR NEGATIVE (HCC): Primary | ICD-10-CM

## 2019-04-24 DIAGNOSIS — Z98.890 H/O MITRAL VALVE REPAIR: ICD-10-CM

## 2019-04-24 DIAGNOSIS — Z17.1 MALIGNANT NEOPLASM OF UPPER-INNER QUADRANT OF LEFT BREAST IN FEMALE, ESTROGEN RECEPTOR NEGATIVE (HCC): Primary | ICD-10-CM

## 2019-04-24 PROCEDURE — 99213 OFFICE O/P EST LOW 20 MIN: CPT | Performed by: SURGERY

## 2019-04-24 RX ORDER — ATORVASTATIN CALCIUM 20 MG/1
20 TABLET, FILM COATED ORAL DAILY
COMMUNITY

## 2019-04-24 NOTE — PROGRESS NOTES
Chief Complaint: Zeny Steve is a 73 y.o. female who was seen in consultation at the request of for newly diagnosed breast cancer and a followup visit    History of Present Illness:  Patient presents with newly diagnosed breast cancer LEFT breast, second primary.    Her past breast cancer was diagnosed at age 41.  She was treated for this in 1986.  At that time she had a lumpectomy with Dr. mccormick as well as an axillary node dissection per her report.  She then received radiation therapy with Dr. Garsia at [facility].  She received no chemotherapy or tamoxifen per her report.    She notices some asymmetry in her breast exam in general as related to her past history of breast cancer.  She does note however that she did notice a thickening in the superior left breast around the time her sister was going to her cancer treatment.    She noted no other new masses, skin changes, nipple discharge, nipple changes prior to her most recent imaging.  Her most recent imaging includes the following: August 22, 2013 [facility] screening mammogram shows moderately dense breast tissue was stable upper outer quadrant left lumpectomy changes BI-RADS 2.  March 25, 2016 screening mammogram at [facility] scattered fiber glandular densities, mass in the upper inner quadrant left breast, BI-RADS 0.  Then on April 7, 2016 at [facility] left diagnostic mammogram and ultrasound showed a 1.3 cm mass at 11:00, 7 cm from the nipple, posterior one third.  Ultrasound targeted to this area 11:00 8 cm from the nipple shows a 1.3 x 1.1 cm mass no abnormal lymph nodes seen BI-RADS 4.    She had a biopsy on the following day that showed: August 16, 2016 left breast ultrasound-guided biopsy a 12-gauge device ×6 cores.  Cork marker left in place.  Invasive mammary carcinoma, no special type, intermediate grade, no in situ component, fever new primary based on mammographic imaging, 7 mm, estrogen 0 progesterone 0 Ki-67 80%  with a HER-2/willy ratio 1.15 and a copy number of 3.8.    I then ordered a breast MRI that showed in the left breast 12:00 posterior one third, 8 cm from the nipple a 2 x 1.6 x 1.5 cm mass.  Clip is within the mass, no other findings on the left, no findings on the right, no lymph nodes on the left looked abnormal.  She has her  ovaries, is postmenopausal, and takes nor hormones.  She had her uterus removed many years ago for bleeding.  Her family history includes the following: Mother age 40, sister age 40 named Christine Watson, a patient of ours, and niece aged 33 also a patient of ours named Shandra Bowling.      Pathology from left breast mastectomy. Note no sentinel lymph nodes were taken due to absence of radiotracer migration due to previous axillary lymph node dissection. Pathology returned as invasive mammary carcinoma, no special type, high grade, 3, 3, 3, 1.2 cm. Margins are clear closest margin being 1.2 cm from the deep margin. Negative skin and nipple. Stage TIc Nx    She reports no redness warmth or drainage from the incision.  Her drain has had 5 mL -5 mL- 5 mL over the past 3 days.  She took pain medicine for the first 2 days but then stopped this.      In the interim, Zeny took adjuvant TC ×4 cycles given every 3 weeks through a PICC line with Dr. Lebron.  She used a dignicap To keep her hair however she felt that this was only moderately helpful and was quite a time commitment.    She did not need radiation due to previous radiation.    She denies any nodules or skin discolorations to the left chest wall and denies any lumps in her armpits or neck.  She denies any masses, skin changes, nipple changes or discharge right breast.  She denies any headache, changes in balance, cough, belly pain, bone pain.    Her most recent imaging includes a right screening mammogram March 27, 2017 with no abnormal findings.  BI-RADS 1.      In the interim,  Zeny Steve  has done well.  She has noted no changes in her  breast exam. No new masses, skin changes, nipple changes, nipple discharge RT breast.   She denies headache, bone pain, belly pain, cough, changes in vision or gait.    Her most recent imaging includes the following:  [facility] RIGHT screen mammo 3-29-18- [facility]att [facility]G- BR2    She has gained 8#.      Interval history:    In the interim,  Zeny Steve  has done well.  She has noted no changes in her breast exam. No new masses, skin changes, nipple changes, nipple discharge RT breast. No chest wall nodules or discolorations LEFT.  She denies headache, bone pain, belly pain, cough, changes in vision or gait.  Her most recent imaging includes the following:  [facility] April 2, 2019 right screening mammogram with 3D.  Scattered fibroglandular densities in the right breast.  BI-RADS 1.    She continues to wear her prosthesis.    She had a normal heart cath and then went on to get both of her knees replaced in 2018    Review of Systems:  Review of Systems   Constitutional: Positive for unexpected weight change (8 LB GAIN).   Respiratory: Positive for shortness of breath (ONLY W/PHYSICAL EXERTION).    Gastrointestinal: Positive for constipation.   Genitourinary: Positive for bladder incontinence.    Musculoskeletal: Positive for arthralgias.   Psychiatric/Behavioral: Positive for depression. The patient is nervous/anxious.    All other systems reviewed and are negative.       Past Medical and Surgical History:  Breast Biopsy History:  Patient has had the following breast biopsies:1986, LEFT BREAST BIOPSY, MALIGNANT  Breast Cancer HIstory:  Patient has the following past medical history of breast cancer treatment: LEFT BREAST LUMPECTOMY 1986, FOLLOWED BY RADIATION 6/1986.   Breast Operations, and year:  LEFT BREAST LUMPECTOMY BY DR. WILLAMS 1986  Obstetric/Gynecologic History:  Age menstrual periods began: 10  Patient is postmenopausal due to removal of her uterus in the following year: 1977   Number of pregnancies:3  Number  of live births: 3  Number of abortions or miscarriages: 0  Age of delivery of first child: 17 YRS OLD   Patient did not breast feed.  Length of time taking birth control pills: APPROX. 2 YRS   Patient has never taken hormone replacement  PATIENT HAS OVARIES.     Past Surgical History:   Procedure Laterality Date   • BREAST LUMPECTOMY Left 1986   • CATARACT EXTRACTION     • GANGLION CYST EXCISION Left 1966   • HYSTERECTOMY  1977   • MASTECTOMY WITH SENTINEL NODE BIOPSY AND AXILLARY NODE DISSECTION Left 10/20/2016    Procedure: left total mastectomy;  Surgeon: Sneha Jameson MD;  Location: [facility] OR [facility];  Service:    • MITRAL VALVULOPLASTY  06/10/2014   • MOLE REMOVAL      MULTIPLE SITES   • TOE NAIL AMPUTATION      LEFT FOOT       Past Medical History:   Diagnosis Date   • Acid reflux    • Anemia    • Arthritis    • CAD (coronary artery disease)    • Cancer (CMS/HCC)     LUMPECTOMY 1986, HAS NEW SPOT IN LT BREAST   • Disease of thyroid gland     HYPOTHYROID   • Hyperlipidemia    • Knee pain     LEFT KNEE-ARTHRITIS/ NEEDS TOTAL KNEE REPLACEMENT   • Mitral regurgitation    • Mitral valve disorder     HAD MVR 6-2014   • Osteoarthritis    • Palpitations    • PONV (postoperative nausea and vomiting)    • Seasonal allergies    • Vitamin D deficiency        Prior Hospitalizations, other than for surgery or childbirth, and year:  NONE     Social History     Socioeconomic History   • Marital status:      Spouse name: Not on file   • Number of children: Not on file   • Years of education: Not on file   • Highest education level: Not on file   Tobacco Use   • Smoking status: Never Smoker   • Smokeless tobacco: Never Used   Substance and Sexual Activity   • Alcohol use: No   • Drug use: No     Patient is .  Patient is retired.  Patient drinks 1 servings of caffeine per day.    Family History:  Family History   Problem Relation Age of Onset   • Breast cancer Mother 40   • Lung cancer Father    • Breast cancer  "Sister 40        BREAST CA REACCURANCE   • Breast cancer Other 33   • Lung cancer Other        Vital Signs:  /62 (BP Location: Right arm, Patient Position: Sitting, Cuff Size: Adult)   Pulse 76   Ht 160 cm (63\")   Wt 74.4 kg (164 lb)   LMP  (LMP Unknown)   SpO2 98%   Breastfeeding? No   BMI 29.05 kg/m²      Medications:    Current Outpatient Medications:   •  acetaminophen (TYLENOL) 500 MG tablet, Take 500 mg by mouth Every 6 (Six) Hours As Needed for mild pain (1-3)., Disp: , Rfl:   •  aspirin 81 MG EC tablet, Take 81 mg by mouth Daily., Disp: , Rfl:   •  atorvastatin (LIPITOR) 20 MG tablet, Take 20 mg by mouth Daily., Disp: , Rfl:   •  bumetanide (BUMEX) 1 MG tablet, Take 1 mg by mouth., Disp: , Rfl:   •  celecoxib (CeleBREX) 200 MG capsule, Take 200 mg by mouth Daily., Disp: , Rfl:   •  cetirizine (ZyrTEC) 10 MG tablet, Take 10 mg by mouth Every Evening., Disp: , Rfl:   •  cimetidine (TAGAMET) 200 MG tablet, Take 200 mg by mouth Every Evening., Disp: , Rfl:   •  glucosamine sulfate 500 MG capsule capsule, Take  by mouth 3 (Three) Times a Day With Meals., Disp: , Rfl:   •  levothyroxine (SYNTHROID, LEVOTHROID) 50 MCG tablet, Take 50 mcg by mouth Every Morning., Disp: , Rfl:   •  metoprolol succinate XL (TOPROL-XL) 50 MG 24 hr tablet, Take 100 mg by mouth Every Evening., Disp: , Rfl:   •  potassium chloride (K-DUR) 10 MEQ CR tablet, Take 10 mEq by mouth., Disp: , Rfl:   •  vitamin D (ERGOCALCIFEROL) 86658 UNITS capsule capsule, 50,000 Units Every 7 (Seven) Days. TAKES ON SUNDAY, Disp: , Rfl:      Allergies:  Allergies   Allergen Reactions   • Codeine Rash     rash       Physical Examination:  /62 (BP Location: Right arm, Patient Position: Sitting, Cuff Size: Adult)   Pulse 76   Ht 160 cm (63\")   Wt 74.4 kg (164 lb)   LMP  (LMP Unknown)   SpO2 98%   Breastfeeding? No   BMI 29.05 kg/m²   General Appearance:  Patient is in no distress.  She is well kept and has an average build. "   Psychiatric:  Patient with appropriate mood and affect. Alert and oriented to self, time, and place.    Breast, RIGHT:  medium sized, asymmetric with the contralateral  surgically absent side.  Breast skin is without erythema, edema, rashes.  There are no visible abnormalities upon inspection during the arm-raising maneuver or with hands on hips in the sitting position. There is no nipple retraction, discharge or nipple/areolar skin changes.There are no masses palpable in the sitting or supine positions.    Breast, LEFT:  surgically absent with a well healed vertical incision from her mastectomy 2016.  The incision was made vertically due to the proximity of tumor to skin at the 12:00 middle ring location.  No skin nodules or discolorations.    - Note median sternotomy from mitral valve repair in .      Lymphatic:  There is no axillary, cervical, infraclavicular, or supraclavicular adenopathy bilaterally.  There is a soft tissue void in the left axilla from her prior axillary node dissection.  Eyes:  Pupils are round and reactive to light.  Cardiovascular:  Heart rate and rhythm are regular.  Respiratory:  Lungs are clear bilaterally with no crackles or wheezes in any lung field.  Gastrointestinal:  Abdomen is soft, nondistended, and nontender.   Musculoskeletal:  Good strength in all 4 extremities.   There is limited range of motion in the LEFT shoulder, normal range of motion in the RIGHT shoulder.    Skin:  No new skin lesions or rashes on the skin excluding the breast (see breast exam above).        Imagin-22-13  [facility]  SCREENING MAMMOGRAM  BOTHE, JERMAN  Stable post lumpectomy scarring in the upper outer left breast.  BIRADS:  2    3-25-16    MAMMOGRAM SCREENING BILATERAL  BOTHE, JERMAN  Scattered fibroglandular densities.  Irregular mass seen in the upper inner quadrant of the left breast.  BIRADS CATEGORY:  0    4-17-16    [facility]   LEFT DIAGNOSTIC MAMMO AND  LEFT US    JERMAN CELAYA  The irregular mass in the upper inner quadrant of the left breast persists.  It measure 13 mm and is located at approximately the 11 o’clock position cm from the nipple, posterior depth.  Targeted ultrasound of the left breast at the 11 o’clock 8 cm from the nipple demonstrates an hypoechoic mass 13 x 8 x 11 mm and corresponds to the mammographic mass morphologically abnormal lymph nodes.  BIRADS CATEGORY:  4      08-29-16                               [facility] DR PRADO                              BILATERAL MRI                        JERMAN CELAYA  Left breast 12 o’clock posterior 1/3 of 8 cm from the nipple clip within an irregular enhancing mass. The mass measures 1.5 cm in the anterior to posterior 1.6 cm in the superior to inferior 2.0 cm in the medial to lateral. No other abnormal enhancement left breast. There is no evidence for left axillary adenopathy. There are no abnormal enhancement or morphology in the right.    March 27, 2017 right screening mammogram. Parenchyma is partially fatty replaced. Negative right mammogram status post left mastectomy BI-RADS 1 negative. [facility] RIGHT screen mammo 3-29-18- scatt FG- BR2    [facility] April 2, 2019 right screening mammogram with 3D.  Scattered fibroglandular densities in the right breast.  BI-RADS 1.          Pathology:    8-16-16  [facility]  BIOPSY LEFT US GUIDED    JERMAN CELAYA  Left breast mass at 11 o’clock.  12 G Suros, 6 core specimens, [employer] cork shaped marker.    8-16-16  [facility]   PATHOLOGY    JERMAN CELAYA  Amplification of the HER-2 gene is not detected with ratio of 1.15.    Ki-67= 80%  ER=  0  MO=  0  FINAL DIAGNOSIS:  Left breast 11:00, 8 cm from nipple.  Moderately differentiated ductal adenocarcinoma without in SITU component.  Favor new primary history of 1986 Ipsilateral Breast Ca noted (correlate with mammography).  Length of invasive component in material reviewed 7 mm.  Amplification of  "the HER-2 gene in not detected.  Her2/willy=  3.8  Ratio=  1.15     10/20/16            [facility]                 SURGICAL PATHOLOGY                   JERMAN CELAYA  Final Diagnosis   \"LEFT BREAST MASTECTOMY\":  INVASIVE MAMMARY CARCINOMA, NO SPECIAL TYPE, HIGH GRADE, 1.2 CM, MARGINS CLEAR -  SEE NOTE.  CLOSEST MARGIN: 1.2 CM (DEEP).  NEGATIVE SKIN AND NIPPLE.  NO LYMPH NODES IDENTIFIED.  HORMONE RECEPTORS, HER-2 WILLY APPARENTLY PERFORMED ON PRIOR BIOPSY.   REPEAT/CONFIRMATORY STUDIES ARE AVAILABLE UPON REQUEST.     This high grade tumor is consistent with a primary breast cancer in the absence of other suspicious primary tumor source. No in-situ carcinoma is seen. Confirmatory special stains are available upon request to prove/disprove this  Consideration. The tumor is staged as a primary breast cancer.     THM/erasmo IHC/a/TDJ       Addendum to pathology report dictated that the correct closest margin is 6 mm to the deep margin    Note dated August 2, 2017 medical oncology from Dr. Gurvinder Lebron: November 30 through the 22nd 2017 4 cycles of TC complicated by drug extravasation.    Note from Dr. Gurvinder Lebron dated June 20, 2018: Patient received 4 cycles of Taxotere and cyclophosphamide from November 30, 2016 through February 2, 2017..  Plan for a 4-5 month follow-up.    Note dated January 11, 2019 nurse practitioner Ia a: Lungs.  Patient is due a March 2019 right screening mammogram at [facility].  6 month office visit or sooner if needed.        Assessment:   Diagnosis Plan   1. Malignant neoplasm of upper-inner quadrant of left breast in female, estrogen receptor negative (CMS/HCC)     2. History of breast cancer in female     3. FH: breast cancer     4. H/O mitral valve repair        1-  Left breast, palpable mass 11:00, 7.5 cm from the nipple.  1.3 cm on ultrasound, 2.75 cm on examination, 2.0 cm on MRI.  Clip seen within.  Lymph nodes normal on axillary ultrasound, examination, and MRI.  Invasive mammary carcinoma, no " special type, intermediate grade, no in situ, 7 mm core, estrogen 0 progesterone 0 Ki-67 80%.  HER-2/willy negative with ratio of 1.15 and copy number of 3.8.    Clinical stage TIcN0 stage Ia  Wagner to be second primary not a recurrence.    October 20, 2016, left total mastectomy with no reconstruction.  Lamar lymph node biopsy was attempted but there was no radiotracer migration due to prior complete axillary lymph node dissection.  Pathology- invasive mammary carcinoma, no special type, high grade, 3, 3, 3, 1.2 cm. Margins are clear closest margin being 1.2 cm from the deep margin. Negative skin and nipple. Stage TIc Nx    2-  Past history left breast cancer age 41, status post lumpectomy and radiation 1986 Dr. Felton.  No chemotherapy or tamoxifen.  Upper outer quadrant     3-  Mother age 40, sister (Christine Watson) age 40, niece (Rc Bowling)  age 33  Niece has had genetic testing and found to be negative.  Rc- Currently has pending panel testing.  Rc's My Risk panel was negative for mutation- VUS noted in the APC gene dated 3-17-17    4-  History of mitral valve repair followed by Dr. Pierre- presently cancelled LEFT knee surgery to have heart cath      Plan:   Zeny is doing well today at her 2.5 year FU visit.  We reviewed her interval history, treatment, most recent imaging and reports, and examination together today.    Her weight is stable.      There is no evidence of disease on her imaging or by exam or symptoms.    She has declined survivorship at this time.    I have refilled her postmastectomy bra and prostheses Rx today .    Her next right-sided mammogram is due 4-3-20 at  [facility] .      I have not given her a routine FU in our office. SHe is presently seeing Dr Lebron every 6 mnths and sees him back in July.    I have asked her to continue her self breast exam and to call us in the future with any concerns and would be happy to see her back with any concerns about her exam or  imaging.      Sneha Jameson MD      We spent 15 min in visit 9 in face to face     Next Appointment:  Return for any future concerns.      EMR Dragon/transcription disclaimer:    Much of this encounter note is an electronic transcription/translocation of spoken language to printed text.  The electronic translation of spoken language may permit erroneous, or at times, nonsensical words or phrases to be inadvertently transcribed.  Although I have reviewed the note from such areas, some may still exist.